# Patient Record
(demographics unavailable — no encounter records)

---

## 2024-12-17 NOTE — ASSESSMENT
[FreeTextEntry1] : Mr. Jackson has biopsy-proven Stage 3b colorectal cancer, s/p low anterior resection. There is a clear role for adjuvant chemotherapy in resected stage III colon cancers.  Current standard of care in the postoperative setting is for oxaliplatin-based chemotherapy regimen. Options for treatment include capecitabine and oxaliplatin for at least 3 months versus FOLFOX for 3 to 6 months.  Recent studies have shown that capecitabine and oxaliplatin (CAPEOX) for 3 months appears to be noninferior to the same treatment given for 6 months, with less concern for long-lasting toxicity (Kenton T, Lancet Oncol 2020). Unfortunately, studies have not determined noninferiority of 3 months of FOLFOX versus 6 months in the same setting. Given Murphy's specific pathology, with 7/29 lymph nodes positive for metastatic focus and 10 mesenteric tumor deposits, I have recommended that he pursue additional 3 months of CAPEOX if he tolerates first 3 months of treatment well. Today, Mr. Jackson and I discussed these facts and reviewed the benefits of adjuvant chemotherapy (improvement in progression-free and overall survival).    Also discussed that side effects include but are not limited to fatigue, diarrhea, nausea, vomiting, allergic reactions, increased risk of infection, low blood counts, neuropathy, cold intolerance, and rare cases of hand and foot syndrome. He is interested in proceeding forward with CAPEOX. Educational materials on this regimen provided at end of today's visit.    We discussed the fact that a mediport must be placed prior to beginning chemotherapy. He will be referred to our interventional radiologists at the conclusion of today's visit to plan schedule placement.   Murphy will also return for chemotherapy education at which point further questions can be answered. He should also continue to follow w/ Dr. Sanz for post-op care, repeat scopes in future.

## 2024-12-19 NOTE — REASON FOR VISIT
[Initial Consultation] : an initial consultation [Family Member] : family member [FreeTextEntry2] : colorectal cancer

## 2024-12-19 NOTE — CONSULT LETTER
[Dear  ___] : Dear  [unfilled], [Consult Letter:] : I had the pleasure of evaluating your patient, [unfilled]. [Please see my note below.] : Please see my note below. [Consult Closing:] : Thank you very much for allowing me to participate in the care of this patient.  If you have any questions, please do not hesitate to contact me. [Sincerely,] : Sincerely, [FreeTextEntry2] : Dr. Claude Sanz [FreeTextEntry3] : Tu Jackson MD

## 2024-12-19 NOTE — ADDENDUM
[FreeTextEntry1] : All medical record entries made by the Scribe were at my, Dr. Tu Jackson, direction and personally dictated by me on 12/17/2024. I have reviewed the chart and agree that the record accurately reflects my personal performance of the history, physical exam, assessment and plan. I have also personally directed, reviewed, and agreed with the chart.   I, Tonny Belle, documented this note as a scribe on behalf of Dr. uT Jackson on 12/17/2024.

## 2024-12-19 NOTE — REVIEW OF SYSTEMS
[Diarrhea: Grade 0] : Diarrhea: Grade 0 [Anxiety] : anxiety [Fever] : no fever [Recent Change In Weight] : ~T no recent weight change [Dysphagia] : no dysphagia [Shortness Of Breath] : no shortness of breath [Abdominal Pain] : no abdominal pain [Vomiting] : no vomiting [Constipation] : no constipation [Dysuria] : no dysuria [Skin Rash] : no skin rash [Easy Bruising] : no tendency for easy bruising [de-identified] : chronic

## 2024-12-19 NOTE — PHYSICAL EXAM
[Normal] : affect appropriate [de-identified] : anicteric [de-identified] : well-healed laparoscopic wounds

## 2024-12-19 NOTE — HISTORY OF PRESENT ILLNESS
[Disease: _____________________] : Disease: [unfilled] [T: ___] : T[unfilled] [N: ___] : N[unfilled] [AJCC Stage: ____] : AJCC Stage: [unfilled] [de-identified] : Murphy Jackson is a 79-year-old here for evaluation of colorectal cancer.  On 10/15/24 Mr. Jackson underwent CT A/P for generalized abdominal pain, which revealed moderate thickening involving the mid-sigmoid colon surrounded by soft tissue nodularity sigmoid mesentery and more focal 1.7 cm nodule suspicious for enlarged abnormal lymph nodes in the possibly of underlying sigmoid neoplasm has to be excluded. Subsequently completed a colonoscopy w/ Dr. Sanz, with discovery of likely malignant partially obstructing tumor in the recto-sigmoid colon, which was biopsied and tattooed. Pathology confirmed infiltrating adenocarcinoma, moderately differentiated. Immunohistochemical studies positive for CDX-2, supporting enteric phenotype w/ pMMR.  CT Chest completed to rule out metastasis, showing several nonspecific micronodules, the largest measuring 3 to 4 mm within the superior segment right lower lobe.   He underwent low anterior resection w/ Dr. Sanz on 11/27/24 for rectosigmoid upper rectal cancer. Surgical pathology demonstrative of a moderately differentiated stage IIIB colon cancer, 4.7 cm in size w/ negative margins. 7 out of 29 lymph nodes positive for metastatic focus and 10 mesenteric tumor deposits. No lymphovascular invasion.   At today's visit, Murphy notes death of his partner ~1 year ago due to lung cancer, who had received chemotherapy. States he is recovering well from surgery, with improvement in bowel function gradually. Reports minimal issues with urgency, notes generally well-formed stools.  Endorses [facility] of colon cancer in mother - death at 73y [de-identified] : infiltrating adenocarcinoma, moderately differentiated [de-identified] : CDX-2, [facility]

## 2024-12-19 NOTE — REVIEW OF SYSTEMS
[Diarrhea: Grade 0] : Diarrhea: Grade 0 [Anxiety] : anxiety [Fever] : no fever [Recent Change In Weight] : ~T no recent weight change [Dysphagia] : no dysphagia [Shortness Of Breath] : no shortness of breath [Abdominal Pain] : no abdominal pain [Vomiting] : no vomiting [Constipation] : no constipation [Dysuria] : no dysuria [Skin Rash] : no skin rash [Easy Bruising] : no tendency for easy bruising [de-identified] : chronic

## 2024-12-19 NOTE — CONSULT LETTER
[Dear  ___] : Dear  [unfilled], [Consult Letter:] : I had the pleasure of evaluating your patient, [unfilled]. [Please see my note below.] : Please see my note below. [Consult Closing:] : Thank you very much for allowing me to participate in the care of this patient.  If you have any questions, please do not hesitate to contact me. [Sincerely,] : Sincerely, [FreeTextEntry3] : Tu Jackson MD [FreeTextEntry2] : Dr. Claude Sanz

## 2024-12-19 NOTE — RESULTS/DATA
[FreeTextEntry1] : Murphy Jackson is a 79-year-old here for evaluation of colorectal cancer, s/p low anterior resection.

## 2024-12-19 NOTE — ADDENDUM
[FreeTextEntry1] : All medical record entries made by the Scribe were at my, Dr. Tu Jackson, direction and personally dictated by me on 12/17/2024. I have reviewed the chart and agree that the record accurately reflects my personal performance of the history, physical exam, assessment and plan. I have also personally directed, reviewed, and agreed with the chart.   I, Tonny Belle, documented this note as a scribe on behalf of Dr. Tu Jackson on 12/17/2024.

## 2024-12-19 NOTE — HISTORY OF PRESENT ILLNESS
[Disease: _____________________] : Disease: [unfilled] [T: ___] : T[unfilled] [N: ___] : N[unfilled] [AJCC Stage: ____] : AJCC Stage: [unfilled] [de-identified] : Murphy Jackson is a 79-year-old here for evaluation of colorectal cancer.  On 10/15/24 Mr. Jackson underwent CT A/P for generalized abdominal pain, which revealed moderate thickening involving the mid-sigmoid colon surrounded by soft tissue nodularity sigmoid mesentery and more focal 1.7 cm nodule suspicious for enlarged abnormal lymph nodes in the possibly of underlying sigmoid neoplasm has to be excluded. Subsequently completed a colonoscopy w/ Dr. Sanz, with discovery of likely malignant partially obstructing tumor in the recto-sigmoid colon, which was biopsied and tattooed. Pathology confirmed infiltrating adenocarcinoma, moderately differentiated. Immunohistochemical studies positive for CDX-2, supporting enteric phenotype w/ pMMR.  CT Chest completed to rule out metastasis, showing several nonspecific micronodules, the largest measuring 3 to 4 mm within the superior segment right lower lobe.   He underwent low anterior resection w/ Dr. Sanz on 11/27/24 for rectosigmoid upper rectal cancer. Surgical pathology demonstrative of a moderately differentiated stage IIIB colon cancer, 4.7 cm in size w/ negative margins. 7 out of 29 lymph nodes positive for metastatic focus and 10 mesenteric tumor deposits. No lymphovascular invasion.   At today's visit, Murphy notes death of his partner ~1 year ago due to lung cancer, who had received chemotherapy. States he is recovering well from surgery, with improvement in bowel function gradually. Reports minimal issues with urgency, notes generally well-formed stools.  Endorses [facility] of colon cancer in mother - death at 73y [de-identified] : infiltrating adenocarcinoma, moderately differentiated [de-identified] : CDX-2, [facility]

## 2025-01-08 NOTE — HISTORY OF PRESENT ILLNESS
[Home] : at home, [unfilled] , at the time of the visit. [Other Location: e.g. Home (Enter Location, City,State)___] : at [unfilled] [Verbal consent obtained from patient] : the patient, [unfilled] [FreeTextEntry8] : 79-year-old male with history of colorectal cancer complaining of episode of dysuria after starting his first dose of Xeloda earlier today.  Patient denies any assoc fever, chills, back pain or vomiting.  Patient states he tried to call his doctor, but a message was left on his voicemail

## 2025-01-08 NOTE — PLAN
[FreeTextEntry1] : Will start on Cipro 500 mg p.o. twice daily x 5 days Patient instructed to follow-up with his provider in the morning Instructions and precautions reviewed

## 2025-01-08 NOTE — PHYSICAL EXAM
[de-identified] : This was a telephonic evaluation only as patient could not access his smart device

## 2025-01-08 NOTE — REVIEW OF SYSTEMS
[Fever] : no fever [Chills] : no chills [Chest Pain] : no chest pain [Shortness Of Breath] : no shortness of breath [Abdominal Pain] : no abdominal pain [Vomiting] : no vomiting [Dysuria] : dysuria

## 2025-01-29 NOTE — CONSULT LETTER
[Dear  ___] : Dear  [unfilled], [Please see my note below.] : Please see my note below. [Consult Closing:] : Thank you very much for allowing me to participate in the care of this patient.  If you have any questions, please do not hesitate to contact me. [Sincerely,] : Sincerely, [Courtesy Letter:] : I had the pleasure of seeing your patient, [unfilled], in my office today. [FreeTextEntry2] : Dr. Claude Sanz [FreeTextEntry3] : Tu Jackson MD [DrEvelia  ___] : Dr. BUSBY

## 2025-02-06 NOTE — REVIEW OF SYSTEMS
[Diarrhea: Grade 0] : Diarrhea: Grade 0 [Anxiety] : anxiety [Fever] : no fever [Recent Change In Weight] : ~T no recent weight change [Dysphagia] : no dysphagia [Shortness Of Breath] : no shortness of breath [Abdominal Pain] : no abdominal pain [Vomiting] : no vomiting [Constipation] : no constipation [Dysuria] : no dysuria [Skin Rash] : no skin rash [Easy Bruising] : no tendency for easy bruising [de-identified] : chronic

## 2025-02-06 NOTE — HISTORY OF PRESENT ILLNESS
[Disease: _____________________] : Disease: [unfilled] [T: ___] : T[unfilled] [N: ___] : N[unfilled] [AJCC Stage: ____] : AJCC Stage: [unfilled] [de-identified] : Murphy Jackson presented at 79 years old for evaluation of colorectal cancer.  On 10/15/24 Mr. Jackson underwent CT A/P for generalized abdominal pain, which revealed moderate thickening involving the mid-sigmoid colon surrounded by soft tissue nodularity sigmoid mesentery and more focal 1.7 cm nodule suspicious for enlarged abnormal lymph nodes in the possibly of underlying sigmoid neoplasm has to be excluded. Subsequently completed a colonoscopy w/ Dr. Sanz, with discovery of likely malignant partially obstructing tumor in the recto-sigmoid colon, which was biopsied and tattooed. Pathology confirmed infiltrating adenocarcinoma, moderately differentiated. Immunohistochemical studies positive for CDX-2, supporting enteric phenotype w/ pMMR.  CT Chest completed to rule out metastasis, showing several nonspecific micronodules, the largest measuring 3 to 4 mm within the superior segment right lower lobe.   He underwent low anterior resection w/ Dr. Sanz on 11/27/24 for rectosigmoid upper rectal cancer. Surgical pathology demonstrative of a moderately differentiated stage IIIB colon cancer, 4.7 cm in size w/ negative margins. 7 out of 29 lymph nodes positive for metastatic focus and 10 mesenteric tumor deposits. No lymphovascular invasion.   At today's visit, Murphy notes death of his partner ~1 year ago due to lung cancer, who had received chemotherapy. States he is recovering well from surgery, with improvement in bowel function gradually. Reports minimal issues with urgency, notes generally well-formed stools.  Endorses [facility] of colon cancer in mother - death at 73y [de-identified] : infiltrating adenocarcinoma, moderately differentiated [de-identified] : CDX-2, [facility] [de-identified] : Mr. Jackson returns for a follow up after starting CAPEOX treatment on 1/8/25.  States that he is tolerating treatment at this time. Reports that burning sensation while urinating (which developed on first day of starting treatment) has self-resolved, urinalysis completed on 1/11/25 was unrevealing. Notes that he has experienced some mild nausea but not at level requiring him to use PO antiemetics. Also constipation for first 2-3 days after treatment, diarrhea w/ frequency beginning on day 4 but improving by week 2. Some cold sensitivity and neuropathy, particularly during week 1, as well as fatigue. Endorses moisturizing skin including hands and feet often. Denies hematuria.

## 2025-02-06 NOTE — RESULTS/DATA
[FreeTextEntry1] : Murphy Jackson is a 79-year-old here for follow up evaluation of colorectal cancer, s/p low anterior resection. On CAPEOX.

## 2025-02-06 NOTE — ADDENDUM
[FreeTextEntry1] : All medical record entries made by the Scribe were at my, Dr. Tu Jackson, direction and personally dictated by me on 01/29/2025. I have reviewed the chart and agree that the record accurately reflects my personal performance of the history, physical exam, assessment and plan. I have also personally directed, reviewed, and agreed with the chart.   I, Tonny Belle, documented this note as a scribe on behalf of Dr. Tu Jackson on 01/29/2025.

## 2025-02-06 NOTE — REVIEW OF SYSTEMS
[Diarrhea: Grade 0] : Diarrhea: Grade 0 [Anxiety] : anxiety [Fever] : no fever [Recent Change In Weight] : ~T no recent weight change [Dysphagia] : no dysphagia [Shortness Of Breath] : no shortness of breath [Abdominal Pain] : no abdominal pain [Vomiting] : no vomiting [Constipation] : no constipation [Dysuria] : no dysuria [Skin Rash] : no skin rash [Easy Bruising] : no tendency for easy bruising [de-identified] : chronic

## 2025-02-06 NOTE — HISTORY OF PRESENT ILLNESS
[Disease: _____________________] : Disease: [unfilled] [T: ___] : T[unfilled] [N: ___] : N[unfilled] [AJCC Stage: ____] : AJCC Stage: [unfilled] [de-identified] : Murphy Jackson presented at 79 years old for evaluation of colorectal cancer.  On 10/15/24 Mr. Jackson underwent CT A/P for generalized abdominal pain, which revealed moderate thickening involving the mid-sigmoid colon surrounded by soft tissue nodularity sigmoid mesentery and more focal 1.7 cm nodule suspicious for enlarged abnormal lymph nodes in the possibly of underlying sigmoid neoplasm has to be excluded. Subsequently completed a colonoscopy w/ Dr. Sanz, with discovery of likely malignant partially obstructing tumor in the recto-sigmoid colon, which was biopsied and tattooed. Pathology confirmed infiltrating adenocarcinoma, moderately differentiated. Immunohistochemical studies positive for CDX-2, supporting enteric phenotype w/ pMMR.  CT Chest completed to rule out metastasis, showing several nonspecific micronodules, the largest measuring 3 to 4 mm within the superior segment right lower lobe.   He underwent low anterior resection w/ Dr. Sanz on 11/27/24 for rectosigmoid upper rectal cancer. Surgical pathology demonstrative of a moderately differentiated stage IIIB colon cancer, 4.7 cm in size w/ negative margins. 7 out of 29 lymph nodes positive for metastatic focus and 10 mesenteric tumor deposits. No lymphovascular invasion.   At today's visit, Murphy notes death of his partner ~1 year ago due to lung cancer, who had received chemotherapy. States he is recovering well from surgery, with improvement in bowel function gradually. Reports minimal issues with urgency, notes generally well-formed stools.  Endorses [facility] of colon cancer in mother - death at 73y [de-identified] : infiltrating adenocarcinoma, moderately differentiated [de-identified] : CDX-2, [facility] [de-identified] : Mr. Jackson returns for a follow up after starting CAPEOX treatment on 1/8/25.  States that he is tolerating treatment at this time. Reports that burning sensation while urinating (which developed on first day of starting treatment) has self-resolved, urinalysis completed on 1/11/25 was unrevealing. Notes that he has experienced some mild nausea but not at level requiring him to use PO antiemetics. Also constipation for first 2-3 days after treatment, diarrhea w/ frequency beginning on day 4 but improving by week 2. Some cold sensitivity and neuropathy, particularly during week 1, as well as fatigue. Endorses moisturizing skin including hands and feet often. Denies hematuria.

## 2025-02-06 NOTE — PHYSICAL EXAM
[Normal] : affect appropriate [de-identified] : anicteric [de-identified] : well-healed laparoscopic wounds

## 2025-02-06 NOTE — PHYSICAL EXAM
[Normal] : affect appropriate [de-identified] : anicteric [de-identified] : well-healed laparoscopic wounds

## 2025-04-15 NOTE — REVIEW OF SYSTEMS
[Diarrhea: Grade 0] : Diarrhea: Grade 0 [Anxiety] : anxiety [Fever] : no fever [Recent Change In Weight] : ~T no recent weight change [Dysphagia] : no dysphagia [Shortness Of Breath] : no shortness of breath [Abdominal Pain] : no abdominal pain [Vomiting] : no vomiting [Constipation] : no constipation [Dysuria] : no dysuria [Skin Rash] : no skin rash [Easy Bruising] : no tendency for easy bruising [de-identified] : chronic

## 2025-04-15 NOTE — ADDENDUM
[FreeTextEntry1] : All medical record entries made by the Scribe were at my, Dr. Tu Jackson, direction and personally dictated by me on 04/15/2025. I have reviewed the chart and agree that the record accurately reflects my personal performance of the history, physical exam, assessment and plan. I have also personally directed, reviewed, and agreed with the chart.   I, Tonny Belle, documented this note as a scribe on behalf of Dr. Tu Jackson on 04/15/2025.

## 2025-04-15 NOTE — PHYSICAL EXAM
[Normal] : affect appropriate [de-identified] : anicteric [de-identified] : well-healed laparoscopic wounds

## 2025-04-15 NOTE — HISTORY OF PRESENT ILLNESS
[Disease: _____________________] : Disease: [unfilled] [T: ___] : T[unfilled] [N: ___] : N[unfilled] [AJCC Stage: ____] : AJCC Stage: [unfilled] [de-identified] : Murphy Jackson presented at 79 years old for evaluation of colorectal cancer.  On 10/15/24 Mr. Jackson underwent CT A/P for generalized abdominal pain, which revealed moderate thickening involving the mid-sigmoid colon surrounded by soft tissue nodularity sigmoid mesentery and more focal 1.7 cm nodule suspicious for enlarged abnormal lymph nodes in the possibly of underlying sigmoid neoplasm has to be excluded. Subsequently completed a colonoscopy w/ Dr. Sanz, with discovery of likely malignant partially obstructing tumor in the recto-sigmoid colon, which was biopsied and tattooed. Pathology confirmed infiltrating adenocarcinoma, moderately differentiated. Immunohistochemical studies positive for CDX-2, supporting enteric phenotype w/ pMMR.  CT Chest completed to rule out metastasis, showing several nonspecific micronodules, the largest measuring 3 to 4 mm within the superior segment right lower lobe.   He underwent low anterior resection w/ Dr. Sanz on 11/27/24 for rectosigmoid upper rectal cancer. Surgical pathology demonstrative of a moderately differentiated stage IIIB colon cancer, 4.7 cm in size w/ negative margins. 7 out of 29 lymph nodes positive for metastatic focus and 10 mesenteric tumor deposits. No lymphovascular invasion.   At today's visit, Murphy notes death of his partner ~1 year ago due to lung cancer, who had received chemotherapy. States he is recovering well from surgery, with improvement in bowel function gradually. Reports minimal issues with urgency, notes generally well-formed stools.  Endorses [facility] of colon cancer in mother - death at 73y [de-identified] : infiltrating adenocarcinoma, moderately differentiated [de-identified] : CDX-2, [facility] [de-identified] : Mr. Jackson returns for a follow up.  Experienced difficulties on CAPEOX in mid Feb 2025, causing him to hold Xeloda (last Oxali on 1/29/25). Subsequently hospitalized w/ nausea, diarrhea, poor appetite, and entered rehab at [place] for 5-6 weeks afterwards - discharged last week and now undergoing PT. Endorses normal BMs and urination. Denies new pains or neuropathy.   2/15/25 CTA Chest, CT A/P: 1. Diffuse mural thickening and mucosal hyperenhancement throughout the small bowel mucosa is consistent with a nonspecific enteritis. Some chemotherapy agents may cause a similar appearance. Infectious and inflammatory etiologies are also in the differential. 2. Suspected new metastatic deposit within the lingula.

## 2025-07-07 NOTE — PHYSICAL EXAM
[Normal] : affect appropriate [de-identified] : anicteric [de-identified] : well-healed laparoscopic wounds

## 2025-07-07 NOTE — REVIEW OF SYSTEMS
[Diarrhea: Grade 0] : Diarrhea: Grade 0 [Anxiety] : anxiety [Fever] : no fever [Recent Change In Weight] : ~T no recent weight change [Dysphagia] : no dysphagia [Shortness Of Breath] : no shortness of breath [Abdominal Pain] : no abdominal pain [Vomiting] : no vomiting [Constipation] : no constipation [Dysuria] : no dysuria [Skin Rash] : no skin rash [Easy Bruising] : no tendency for easy bruising [de-identified] : chronic

## 2025-07-07 NOTE — HISTORY OF PRESENT ILLNESS
[Disease: _____________________] : Disease: [unfilled] [T: ___] : T[unfilled] [N: ___] : N[unfilled] [AJCC Stage: ____] : AJCC Stage: [unfilled] [de-identified] : Murphy Jackson presented at 79 years old for evaluation of colorectal cancer.  On 10/15/24 Mr. Jackson underwent CT A/P for generalized abdominal pain, which revealed moderate thickening involving the mid-sigmoid colon surrounded by soft tissue nodularity sigmoid mesentery and more focal 1.7 cm nodule suspicious for enlarged abnormal lymph nodes in the possibly of underlying sigmoid neoplasm has to be excluded. Subsequently completed a colonoscopy w/ Dr. Sanz, with discovery of likely malignant partially obstructing tumor in the recto-sigmoid colon, which was biopsied and tattooed. Pathology confirmed infiltrating adenocarcinoma, moderately differentiated. Immunohistochemical studies positive for CDX-2, supporting enteric phenotype w/ pMMR.  CT Chest completed to rule out metastasis, showing several nonspecific micronodules, the largest measuring 3 to 4 mm within the superior segment right lower lobe.   He underwent low anterior resection w/ Dr. Sanz on 11/27/24 for rectosigmoid upper rectal cancer. Surgical pathology demonstrative of a moderately differentiated stage IIIB colon cancer, 4.7 cm in size w/ negative margins. 7 out of 29 lymph nodes positive for metastatic focus and 10 mesenteric tumor deposits. No lymphovascular invasion.   At today's visit, Murphy notes death of his partner ~1 year ago due to lung cancer, who had received chemotherapy. States he is recovering well from surgery, with improvement in bowel function gradually. Reports minimal issues with urgency, notes generally well-formed stools.  Endorses [facility] of colon cancer in mother - death at 73y [de-identified] : infiltrating adenocarcinoma, moderately differentiated [de-identified] : CDX-2, [facility] [de-identified] : Mr. Jackson returns for a follow up.  He states that he is feeling well. He walks 5,000 steps a day consistently and is undergoing PT. Endorses improved strength, though some lingering balance issues. He has also seen Dr. Sanz last month, discussed repeat scans and possible colonoscopy in Oct 2025. He reports normal BMs and good appetite. Denies other medical developments.   04/18/25 CT C/A/P -  1. No convincing evidence of metastatic disease within the chest, abdomen, or pelvis. 2. The previously noted lingular nodular opacity has largely resolved with mild residual groundglass remaining and therefore was likely infectious in etiology. Continued attention on follow-up is suggested.

## 2025-07-07 NOTE — ADDENDUM
[FreeTextEntry1] :  All medical record entries made by the Scribe were at my, Dr. Tu Jackson, direction and personally dictated by me on 07/07/2025. I have reviewed the chart and agree that the record accurately reflects my personal performance of the history, physical exam, assessment and plan. I have also personally directed, reviewed, and agreed with the chart.   I, Tonny Belle, documented this note as a scribe on behalf of Dr. Tu Jackson on 07/07/2025.